# Patient Record
Sex: FEMALE | Race: WHITE | NOT HISPANIC OR LATINO | URBAN - METROPOLITAN AREA
[De-identification: names, ages, dates, MRNs, and addresses within clinical notes are randomized per-mention and may not be internally consistent; named-entity substitution may affect disease eponyms.]

---

## 2020-11-18 ENCOUNTER — OFFICE (OUTPATIENT)
Dept: URBAN - METROPOLITAN AREA CLINIC 102 | Facility: CLINIC | Age: 45
End: 2020-11-18
Payer: COMMERCIAL

## 2020-11-18 VITALS
SYSTOLIC BLOOD PRESSURE: 130 MMHG | DIASTOLIC BLOOD PRESSURE: 92 MMHG | TEMPERATURE: 97.3 F | HEIGHT: 62 IN | HEART RATE: 80 BPM | WEIGHT: 219 LBS

## 2020-11-18 DIAGNOSIS — D50.9 IRON DEFICIENCY ANEMIA, UNSPECIFIED: ICD-10-CM

## 2020-11-18 DIAGNOSIS — R19.7 DIARRHEA, UNSPECIFIED: ICD-10-CM

## 2020-11-18 DIAGNOSIS — R10.13 EPIGASTRIC PAIN: ICD-10-CM

## 2020-11-18 DIAGNOSIS — R13.19 OTHER DYSPHAGIA: ICD-10-CM

## 2020-11-18 PROCEDURE — 99245 OFF/OP CONSLTJ NEW/EST HI 55: CPT

## 2020-11-18 NOTE — SERVICEHPINOTES
DIONTE CHAVIS   is a   45   year old    female who is being seen in consultation at the request of   FABIÁN COOPER   for iron def anemia. She states she first noticed shortness of breath in February which seems to be worsening. She was eventually found to have hgb of 5. Since this time, she has had 2 transfusions and 2 sets of iron transfusions. She is following with hematology and getting labs regularly with them. Most recent labs available from 9/23 with hgb of 8.6. She also had thorough GYN workup and has not had a period in months (OCP pill) is getting IUD placed tomorrow. Is getting regular labs through hematology.Siddharth does have quite a few GI issues. However, no melena or rectal bleeding. She had an EGD/colonoscopy maybe 10 years ago ([place]) and states both were normal. She reports chronic diarrhea ongoing for years, only postprandial. BMs 3-4x/day, BSS type 7. She is not sure if she ever tried imodium. No recent antibiotics or travel. Gallbladder in situ. She had an EGD/colonoscopy approx. 10 years ago ([place]) states everything was normal. Her uncle had colon cancer.Siddharth also reports epigastric pain at times, seems random. She reports dysphagia for some time now, to solids only in proximal esophagus. No obstructive symptoms. Her son has EoE. No n/v. No frequent NSAIDs. No weight loss reports weight gain of 50lbs within the past year--was started on thyroid medication without any loss of weight. OBDULIA